# Patient Record
Sex: FEMALE | Race: WHITE | NOT HISPANIC OR LATINO
[De-identification: names, ages, dates, MRNs, and addresses within clinical notes are randomized per-mention and may not be internally consistent; named-entity substitution may affect disease eponyms.]

---

## 2018-02-15 ENCOUNTER — RESULT REVIEW (OUTPATIENT)
Age: 53
End: 2018-02-15

## 2018-05-09 ENCOUNTER — APPOINTMENT (OUTPATIENT)
Dept: GASTROENTEROLOGY | Facility: CLINIC | Age: 53
End: 2018-05-09

## 2019-04-25 ENCOUNTER — RESULT REVIEW (OUTPATIENT)
Age: 54
End: 2019-04-25

## 2020-01-22 ENCOUNTER — RESULT REVIEW (OUTPATIENT)
Age: 55
End: 2020-01-22

## 2020-04-25 ENCOUNTER — MESSAGE (OUTPATIENT)
Age: 55
End: 2020-04-25

## 2020-05-04 ENCOUNTER — APPOINTMENT (OUTPATIENT)
Dept: DISASTER EMERGENCY | Facility: HOSPITAL | Age: 55
End: 2020-05-04

## 2020-05-05 LAB
SARS-COV-2 IGG SERPL IA-ACNC: 1.3 AU/ML
SARS-COV-2 IGG SERPL QL IA: NEGATIVE

## 2021-03-15 ENCOUNTER — RESULT REVIEW (OUTPATIENT)
Age: 56
End: 2021-03-15

## 2023-07-18 ENCOUNTER — APPOINTMENT (OUTPATIENT)
Dept: ORTHOPEDIC SURGERY | Facility: CLINIC | Age: 58
End: 2023-07-18
Payer: COMMERCIAL

## 2023-07-18 ENCOUNTER — APPOINTMENT (OUTPATIENT)
Dept: ORTHOPEDIC SURGERY | Facility: CLINIC | Age: 58
End: 2023-07-18

## 2023-07-18 VITALS — HEIGHT: 63 IN | WEIGHT: 140 LBS | BODY MASS INDEX: 24.8 KG/M2

## 2023-07-18 DIAGNOSIS — M15.1 HEBERDEN'S NODES (WITH ARTHROPATHY): ICD-10-CM

## 2023-07-18 DIAGNOSIS — L40.50 ARTHROPATHIC PSORIASIS, UNSPECIFIED: ICD-10-CM

## 2023-07-18 PROCEDURE — 99203 OFFICE O/P NEW LOW 30 MIN: CPT

## 2023-07-18 PROCEDURE — 73130 X-RAY EXAM OF HAND: CPT | Mod: 50

## 2023-07-19 PROBLEM — M15.1: Status: ACTIVE | Noted: 2023-07-19

## 2023-07-19 PROBLEM — L40.50 PSORIATIC ARTHRITIS: Status: ACTIVE | Noted: 2023-07-19

## 2023-07-19 NOTE — DISCUSSION/SUMMARY
[de-identified] : - discussed etiology/ pathophysiology of her complaints in layman's terms\par - reviewed treatment options, conservative and operative\par - reviewed role for bracing, anti-inflammatory medications, injections and ultimately surgery\par - activity modifications\par - could consider CSI in the future should her symptoms warrant it\par - recommend consultation with rheumatology\par - NSAIDs prn pain\par - f/u prn

## 2023-07-19 NOTE — IMAGING
[de-identified] : Bilateral hands\par Heberden's nodes noted\par TTP at DIP joint of bilateral long fingers, R>L\par Full finger ROM\par nvi

## 2023-07-19 NOTE — HISTORY OF PRESENT ILLNESS
[de-identified] : 07/18/2023  ELAINA 58 year F is here for\par Location: Bilateral Hand \par Complaints: Patient reports right middle finger DIP pain, worse with motion and excessive activities.  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  No injuries that she can recall.  Despite this pain she is able to perform all of her activities without limitations.\par Onset: 1 month ago \par Prior treatments: None\par Hand dominance: Left \par Occupation: Nurse at [employer]\par PMH: Psoriasis on a DMARD (managed by her dermatologist)

## 2023-07-19 NOTE — DATA REVIEWED
[FreeTextEntry1] : 3 views of the bilateral hands: Moderate to severe degenerative disease noted involving the long fingers at the DIP and PIP joints, diffuse degenerative changes, no acute fractures

## 2023-10-18 ENCOUNTER — APPOINTMENT (OUTPATIENT)
Dept: INFECTIOUS DISEASE | Facility: CLINIC | Age: 58
End: 2023-10-18
Payer: COMMERCIAL

## 2023-10-18 VITALS
SYSTOLIC BLOOD PRESSURE: 105 MMHG | OXYGEN SATURATION: 97 % | HEIGHT: 63 IN | DIASTOLIC BLOOD PRESSURE: 64 MMHG | BODY MASS INDEX: 23.92 KG/M2 | TEMPERATURE: 97.6 F | HEART RATE: 109 BPM | WEIGHT: 135 LBS

## 2023-10-18 DIAGNOSIS — Z80.0 FAMILY HISTORY OF MALIGNANT NEOPLASM OF DIGESTIVE ORGANS: ICD-10-CM

## 2023-10-18 DIAGNOSIS — Z87.891 PERSONAL HISTORY OF NICOTINE DEPENDENCE: ICD-10-CM

## 2023-10-18 DIAGNOSIS — Z87.2 PERSONAL HISTORY OF DISEASES OF THE SKIN AND SUBCUTANEOUS TISSUE: ICD-10-CM

## 2023-10-18 DIAGNOSIS — C82.03 FOLLICULAR LYMPHOMA GRADE I, INTRA-ABDOMINAL LYMPH NODES: ICD-10-CM

## 2023-10-18 DIAGNOSIS — Z86.59 PERSONAL HISTORY OF OTHER MENTAL AND BEHAVIORAL DISORDERS: ICD-10-CM

## 2023-10-18 DIAGNOSIS — Z80.7 FAMILY HISTORY OF OTHER MALIGNANT NEOPLASMS OF LYMPHOID, HEMATOPOIETIC AND RELATED TISSUES: ICD-10-CM

## 2023-10-18 PROCEDURE — 99203 OFFICE O/P NEW LOW 30 MIN: CPT

## 2023-11-10 ENCOUNTER — EMERGENCY (EMERGENCY)
Facility: HOSPITAL | Age: 58
LOS: 1 days | Discharge: HOME | End: 2023-11-10
Attending: STUDENT IN AN ORGANIZED HEALTH CARE EDUCATION/TRAINING PROGRAM | Admitting: STUDENT IN AN ORGANIZED HEALTH CARE EDUCATION/TRAINING PROGRAM
Payer: COMMERCIAL

## 2023-11-10 VITALS
SYSTOLIC BLOOD PRESSURE: 140 MMHG | RESPIRATION RATE: 18 BRPM | HEART RATE: 67 BPM | DIASTOLIC BLOOD PRESSURE: 68 MMHG | OXYGEN SATURATION: 100 %

## 2023-11-10 VITALS
OXYGEN SATURATION: 100 % | TEMPERATURE: 97 F | SYSTOLIC BLOOD PRESSURE: 142 MMHG | RESPIRATION RATE: 18 BRPM | DIASTOLIC BLOOD PRESSURE: 55 MMHG | HEART RATE: 65 BPM

## 2023-11-10 LAB
ALBUMIN SERPL ELPH-MCNC: 4.2 G/DL — SIGNIFICANT CHANGE UP (ref 3.3–5)
ALBUMIN SERPL ELPH-MCNC: 4.2 G/DL — SIGNIFICANT CHANGE UP (ref 3.3–5)
ALP SERPL-CCNC: 90 U/L — SIGNIFICANT CHANGE UP (ref 40–120)
ALP SERPL-CCNC: 90 U/L — SIGNIFICANT CHANGE UP (ref 40–120)
ALT FLD-CCNC: 22 U/L — SIGNIFICANT CHANGE UP (ref 4–33)
ALT FLD-CCNC: 22 U/L — SIGNIFICANT CHANGE UP (ref 4–33)
ANION GAP SERPL CALC-SCNC: 15 MMOL/L — HIGH (ref 7–14)
ANION GAP SERPL CALC-SCNC: 15 MMOL/L — HIGH (ref 7–14)
AST SERPL-CCNC: 21 U/L — SIGNIFICANT CHANGE UP (ref 4–32)
AST SERPL-CCNC: 21 U/L — SIGNIFICANT CHANGE UP (ref 4–32)
BASOPHILS # BLD AUTO: 0.1 K/UL — SIGNIFICANT CHANGE UP (ref 0–0.2)
BASOPHILS # BLD AUTO: 0.1 K/UL — SIGNIFICANT CHANGE UP (ref 0–0.2)
BASOPHILS NFR BLD AUTO: 0.7 % — SIGNIFICANT CHANGE UP (ref 0–2)
BASOPHILS NFR BLD AUTO: 0.7 % — SIGNIFICANT CHANGE UP (ref 0–2)
BILIRUB SERPL-MCNC: 0.4 MG/DL — SIGNIFICANT CHANGE UP (ref 0.2–1.2)
BILIRUB SERPL-MCNC: 0.4 MG/DL — SIGNIFICANT CHANGE UP (ref 0.2–1.2)
BUN SERPL-MCNC: 13 MG/DL — SIGNIFICANT CHANGE UP (ref 7–23)
BUN SERPL-MCNC: 13 MG/DL — SIGNIFICANT CHANGE UP (ref 7–23)
CALCIUM SERPL-MCNC: 9.5 MG/DL — SIGNIFICANT CHANGE UP (ref 8.4–10.5)
CALCIUM SERPL-MCNC: 9.5 MG/DL — SIGNIFICANT CHANGE UP (ref 8.4–10.5)
CHLORIDE SERPL-SCNC: 101 MMOL/L — SIGNIFICANT CHANGE UP (ref 98–107)
CHLORIDE SERPL-SCNC: 101 MMOL/L — SIGNIFICANT CHANGE UP (ref 98–107)
CO2 SERPL-SCNC: 23 MMOL/L — SIGNIFICANT CHANGE UP (ref 22–31)
CO2 SERPL-SCNC: 23 MMOL/L — SIGNIFICANT CHANGE UP (ref 22–31)
CREAT SERPL-MCNC: 0.5 MG/DL — SIGNIFICANT CHANGE UP (ref 0.5–1.3)
CREAT SERPL-MCNC: 0.5 MG/DL — SIGNIFICANT CHANGE UP (ref 0.5–1.3)
EGFR: 109 ML/MIN/1.73M2 — SIGNIFICANT CHANGE UP
EGFR: 109 ML/MIN/1.73M2 — SIGNIFICANT CHANGE UP
EOSINOPHIL # BLD AUTO: 0.21 K/UL — SIGNIFICANT CHANGE UP (ref 0–0.5)
EOSINOPHIL # BLD AUTO: 0.21 K/UL — SIGNIFICANT CHANGE UP (ref 0–0.5)
EOSINOPHIL NFR BLD AUTO: 1.5 % — SIGNIFICANT CHANGE UP (ref 0–6)
EOSINOPHIL NFR BLD AUTO: 1.5 % — SIGNIFICANT CHANGE UP (ref 0–6)
GLUCOSE SERPL-MCNC: 146 MG/DL — HIGH (ref 70–99)
GLUCOSE SERPL-MCNC: 146 MG/DL — HIGH (ref 70–99)
HCT VFR BLD CALC: 39.5 % — SIGNIFICANT CHANGE UP (ref 34.5–45)
HCT VFR BLD CALC: 39.5 % — SIGNIFICANT CHANGE UP (ref 34.5–45)
HGB BLD-MCNC: 13.6 G/DL — SIGNIFICANT CHANGE UP (ref 11.5–15.5)
HGB BLD-MCNC: 13.6 G/DL — SIGNIFICANT CHANGE UP (ref 11.5–15.5)
IANC: 12.07 K/UL — HIGH (ref 1.8–7.4)
IANC: 12.07 K/UL — HIGH (ref 1.8–7.4)
IMM GRANULOCYTES NFR BLD AUTO: 0.3 % — SIGNIFICANT CHANGE UP (ref 0–0.9)
IMM GRANULOCYTES NFR BLD AUTO: 0.3 % — SIGNIFICANT CHANGE UP (ref 0–0.9)
LYMPHOCYTES # BLD AUTO: 0.37 K/UL — LOW (ref 1–3.3)
LYMPHOCYTES # BLD AUTO: 0.37 K/UL — LOW (ref 1–3.3)
LYMPHOCYTES # BLD AUTO: 2.7 % — LOW (ref 13–44)
LYMPHOCYTES # BLD AUTO: 2.7 % — LOW (ref 13–44)
MAGNESIUM SERPL-MCNC: 2 MG/DL — SIGNIFICANT CHANGE UP (ref 1.6–2.6)
MAGNESIUM SERPL-MCNC: 2 MG/DL — SIGNIFICANT CHANGE UP (ref 1.6–2.6)
MCHC RBC-ENTMCNC: 32 PG — SIGNIFICANT CHANGE UP (ref 27–34)
MCHC RBC-ENTMCNC: 32 PG — SIGNIFICANT CHANGE UP (ref 27–34)
MCHC RBC-ENTMCNC: 34.4 GM/DL — SIGNIFICANT CHANGE UP (ref 32–36)
MCHC RBC-ENTMCNC: 34.4 GM/DL — SIGNIFICANT CHANGE UP (ref 32–36)
MCV RBC AUTO: 92.9 FL — SIGNIFICANT CHANGE UP (ref 80–100)
MCV RBC AUTO: 92.9 FL — SIGNIFICANT CHANGE UP (ref 80–100)
MONOCYTES # BLD AUTO: 0.83 K/UL — SIGNIFICANT CHANGE UP (ref 0–0.9)
MONOCYTES # BLD AUTO: 0.83 K/UL — SIGNIFICANT CHANGE UP (ref 0–0.9)
MONOCYTES NFR BLD AUTO: 6.1 % — SIGNIFICANT CHANGE UP (ref 2–14)
MONOCYTES NFR BLD AUTO: 6.1 % — SIGNIFICANT CHANGE UP (ref 2–14)
NEUTROPHILS # BLD AUTO: 12.07 K/UL — HIGH (ref 1.8–7.4)
NEUTROPHILS # BLD AUTO: 12.07 K/UL — HIGH (ref 1.8–7.4)
NEUTROPHILS NFR BLD AUTO: 88.7 % — HIGH (ref 43–77)
NEUTROPHILS NFR BLD AUTO: 88.7 % — HIGH (ref 43–77)
NRBC # BLD: 0 /100 WBCS — SIGNIFICANT CHANGE UP (ref 0–0)
NRBC # BLD: 0 /100 WBCS — SIGNIFICANT CHANGE UP (ref 0–0)
NRBC # FLD: 0 K/UL — SIGNIFICANT CHANGE UP (ref 0–0)
NRBC # FLD: 0 K/UL — SIGNIFICANT CHANGE UP (ref 0–0)
PHOSPHATE SERPL-MCNC: 2.4 MG/DL — LOW (ref 2.5–4.5)
PHOSPHATE SERPL-MCNC: 2.4 MG/DL — LOW (ref 2.5–4.5)
PLATELET # BLD AUTO: 237 K/UL — SIGNIFICANT CHANGE UP (ref 150–400)
PLATELET # BLD AUTO: 237 K/UL — SIGNIFICANT CHANGE UP (ref 150–400)
POTASSIUM SERPL-MCNC: 3.8 MMOL/L — SIGNIFICANT CHANGE UP (ref 3.5–5.3)
POTASSIUM SERPL-MCNC: 3.8 MMOL/L — SIGNIFICANT CHANGE UP (ref 3.5–5.3)
POTASSIUM SERPL-SCNC: 3.8 MMOL/L — SIGNIFICANT CHANGE UP (ref 3.5–5.3)
POTASSIUM SERPL-SCNC: 3.8 MMOL/L — SIGNIFICANT CHANGE UP (ref 3.5–5.3)
PROT SERPL-MCNC: 6.4 G/DL — SIGNIFICANT CHANGE UP (ref 6–8.3)
PROT SERPL-MCNC: 6.4 G/DL — SIGNIFICANT CHANGE UP (ref 6–8.3)
RBC # BLD: 4.25 M/UL — SIGNIFICANT CHANGE UP (ref 3.8–5.2)
RBC # BLD: 4.25 M/UL — SIGNIFICANT CHANGE UP (ref 3.8–5.2)
RBC # FLD: 14.2 % — SIGNIFICANT CHANGE UP (ref 10.3–14.5)
RBC # FLD: 14.2 % — SIGNIFICANT CHANGE UP (ref 10.3–14.5)
SODIUM SERPL-SCNC: 139 MMOL/L — SIGNIFICANT CHANGE UP (ref 135–145)
SODIUM SERPL-SCNC: 139 MMOL/L — SIGNIFICANT CHANGE UP (ref 135–145)
WBC # BLD: 13.62 K/UL — HIGH (ref 3.8–10.5)
WBC # BLD: 13.62 K/UL — HIGH (ref 3.8–10.5)
WBC # FLD AUTO: 13.62 K/UL — HIGH (ref 3.8–10.5)
WBC # FLD AUTO: 13.62 K/UL — HIGH (ref 3.8–10.5)

## 2023-11-10 PROCEDURE — 71045 X-RAY EXAM CHEST 1 VIEW: CPT | Mod: 26

## 2023-11-10 PROCEDURE — 93010 ELECTROCARDIOGRAM REPORT: CPT

## 2023-11-10 PROCEDURE — 99284 EMERGENCY DEPT VISIT MOD MDM: CPT

## 2023-11-10 RX ORDER — METOCLOPRAMIDE HCL 10 MG
10 TABLET ORAL ONCE
Refills: 0 | Status: COMPLETED | OUTPATIENT
Start: 2023-11-10 | End: 2023-11-10

## 2023-11-10 RX ORDER — SODIUM CHLORIDE 9 MG/ML
1000 INJECTION, SOLUTION INTRAVENOUS ONCE
Refills: 0 | Status: COMPLETED | OUTPATIENT
Start: 2023-11-10 | End: 2023-11-10

## 2023-11-10 RX ADMIN — SODIUM CHLORIDE 1000 MILLILITER(S): 9 INJECTION, SOLUTION INTRAVENOUS at 02:39

## 2023-11-10 RX ADMIN — Medication 10 MILLIGRAM(S): at 02:38

## 2023-11-10 NOTE — ED ADULT TRIAGE NOTE - NS ED TRIAGE AVPU SCALE
awaiting discharge, no change awaiting consult Alert-The patient is alert, awake and responds to voice. The patient is oriented to time, place, and person. The triage nurse is able to obtain subjective information.

## 2023-11-10 NOTE — ED PROVIDER NOTE - PROGRESS NOTE DETAILS
Richard Dodge PGY3: pt symptomatically improved, avss, neuro intact, results discussed. Return precautions discussed, verbal understanding demonstrated, all questions answered.

## 2023-11-10 NOTE — ED ADULT NURSE NOTE - NSFALLRISKINTERV_ED_ALL_ED

## 2023-11-10 NOTE — ED PROVIDER NOTE - CLINICAL SUMMARY MEDICAL DECISION MAKING FREE TEXT BOX
58-year-old female history of vasovagal in the past presents here with a similar episode with presyncope nausea and vomiting.  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  Patient bearing down when she started feeling lightheaded did not lose consciousness however became nauseous and vomited.  Currently feels nauseous and lightheaded.  3 previous episodes previously worked up diagnosed with vasovagal.  She is denying any room spinning, any weakness, numbness, fever/chills, CP, SOB, Carlos pain, dsyuria.  AVSS, patient appears nauseous, ANO x3, CN II through XII intact, no focal neurodeficits, abdomen soft, ND NT.  Concern for presyncopal episode, consistent with patient's previous episodes of vasovagal, rule out any underlying cardiac arrhythmia, glycemia, dehydration/electrolyte or metabolic abnormality.  We will get labs, EKG, CXR give IVF, antiemetic and reassess.

## 2023-11-10 NOTE — ED PROVIDER NOTE - ATTENDING CONTRIBUTION TO CARE
58-year-old female with history of vasovagal syncope in the past coming in with similar symptoms today.  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  States this usually happens when she is stressed out.  States she has been seen by a cardiologist and had work-up done.  Reports because she she does not actually syncopize she is not on any medications.  Patient reports she is back to her baseline.  Denies chest pain, shortness of breath, abdominal pain, fevers, chills, change in strength or sensation in her extremities.  Patient is well-appearing.  No distress.  Clear to auscultation bilaterally.  Regular rate and rhythm.  Abdomen soft nontender.  No pedal edema.  Differential diagnoses include but not limited to near syncope, electrolyte abnormalities, anemia.  EKG is nonischemic. Fu with cardiologist and pcp for continue care.

## 2023-11-10 NOTE — ED ADULT NURSE REASSESSMENT NOTE - NS ED NURSE REASSESS COMMENT FT1
iv fluids completed without complications, iv site WNL. pt sleeping in stretcher in NAD, RR even and unlabored. maintained on cardiac monitor, NSR at 88 bpm.  at bedside, states no further episodes of vomiting. pt safety measures maintained, side rails up x2.

## 2023-11-10 NOTE — ED PROVIDER NOTE - NSFOLLOWUPINSTRUCTIONS_ED_ALL_ED_FT
In the ED for your presyncopal event with nausea and vomiting.  Episode consistent with previous episodes of vasovagal.  Labs, chest x-ray results have been attached.  We gave you IV fluid and antiemetic Reglan.  Please follow-up with your primary doctor as needed    Please seek immediate medical attention return to ED if you have any new or worsening symptoms.

## 2023-11-10 NOTE — ED ADULT NURSE REASSESSMENT NOTE - NS ED NURSE REASSESS COMMENT FT1
discharge instructions provided by MD at bedside, pt verbalized understanding, papers provided. iv removed, gauze dressing in place. pt A&Ox4, NUNO equal bilaterally, able to walk with steady gait. all belongings with pt,  at side

## 2023-11-10 NOTE — ED ADULT TRIAGE NOTE - CHIEF COMPLAINT QUOTE
Patient called as rapid response for near syncopal episode and vomiting. Endorses dizziness. Denies chest pain, SOB, abdominal pain and headache. Hx. lymphoma, last chemo 3 weeks ago.

## 2023-11-10 NOTE — ED ADULT NURSE NOTE - OBJECTIVE STATEMENT
58 year old female, received to room 6. Pt A&Ox4, ambulatory. Respirations equal and unlabored. Past medical history of lymphoma, last chemo approx 3 weeks ago. Pt was a rapid response in [facility]. Pt had near syncopal episode, pt states she became dizzy and nauseous and felt like she was going to pass out. Pt endorsing nausea at this time, denies vomiting, urinary symptoms, chest pain, SOB, palpitations, dizziness, blurry vision, numbness, tingling, any other complaints. Neuro intact. Pt placed on cardiac monitor, normal sinus. Abdomen soft, nontender, nondistended. Skin dry and intact. 20G IV placed to R antecubital space. Labs drawn and sent. Medicated as per EMR orders. Pending lab results. No acute distress noted. Pt provided with blanket at request. Safety maintained, bed in lowest position, side rails raised, call bell in reach.

## 2024-01-17 ENCOUNTER — APPOINTMENT (OUTPATIENT)
Dept: RHEUMATOLOGY | Facility: CLINIC | Age: 59
End: 2024-01-17

## 2024-01-24 ENCOUNTER — NON-APPOINTMENT (OUTPATIENT)
Age: 59
End: 2024-01-24

## 2024-01-24 ENCOUNTER — LABORATORY RESULT (OUTPATIENT)
Age: 59
End: 2024-01-24

## 2024-01-24 ENCOUNTER — APPOINTMENT (OUTPATIENT)
Dept: RHEUMATOLOGY | Facility: CLINIC | Age: 59
End: 2024-01-24
Payer: COMMERCIAL

## 2024-01-24 VITALS
HEIGHT: 63.5 IN | WEIGHT: 142 LBS | TEMPERATURE: 96.9 F | DIASTOLIC BLOOD PRESSURE: 62 MMHG | BODY MASS INDEX: 24.85 KG/M2 | SYSTOLIC BLOOD PRESSURE: 132 MMHG | OXYGEN SATURATION: 98 % | HEART RATE: 80 BPM

## 2024-01-24 DIAGNOSIS — Z78.9 OTHER SPECIFIED HEALTH STATUS: ICD-10-CM

## 2024-01-24 DIAGNOSIS — Z78.0 ASYMPTOMATIC MENOPAUSAL STATE: ICD-10-CM

## 2024-01-24 PROCEDURE — 99205 OFFICE O/P NEW HI 60 MIN: CPT

## 2024-01-24 PROCEDURE — G2211 COMPLEX E/M VISIT ADD ON: CPT

## 2024-01-24 RX ORDER — GLUCOSAM/CHONDR-MSM1/D3/C/MANG 750-625MG
TABLET ORAL
Refills: 0 | Status: ACTIVE | COMMUNITY

## 2024-01-24 RX ORDER — CLOBETASOL PROPIONATE 0.05 G/100ML
0.05 SHAMPOO TOPICAL DAILY
Qty: 1 | Refills: 0 | Status: ACTIVE | COMMUNITY
Start: 2024-01-24 | End: 1900-01-01

## 2024-01-24 RX ORDER — ASCORBIC ACID 500 MG
TABLET ORAL
Refills: 0 | Status: ACTIVE | COMMUNITY

## 2024-01-24 RX ORDER — ESCITALOPRAM OXALATE 10 MG/1
10 TABLET, FILM COATED ORAL
Refills: 0 | Status: ACTIVE | COMMUNITY

## 2024-01-24 NOTE — PHYSICAL EXAM
[General Appearance - Alert] : alert [General Appearance - In No Acute Distress] : in no acute distress [Sclera] : the sclera and conjunctiva were normal [Extraocular Movements] : extraocular movements were intact [Outer Ear] : the ears and nose were normal in appearance [Neck Appearance] : the appearance of the neck was normal [] : no respiratory distress [Respiration, Rhythm And Depth] : normal respiratory rhythm and effort [Heart Rate And Rhythm] : heart rate was normal and rhythm regular [Heart Sounds] : normal S1 and S2 [Abdomen Soft] : soft [Abdomen Tenderness] : non-tender [Cervical Lymph Nodes Enlarged Anterior Bilaterally] : anterior cervical [Supraclavicular Lymph Nodes Enlarged Bilaterally] : supraclavicular [No CVA Tenderness] : no ~M costovertebral angle tenderness [No Spinal Tenderness] : no spinal tenderness [Motor Tone] : muscle strength and tone were normal [No Focal Deficits] : no focal deficits [Impaired Insight] : insight and judgment were intact [Mood] : the mood was normal [FreeTextEntry1] : minimal redness in scalp

## 2024-01-24 NOTE — HISTORY OF PRESENT ILLNESS
[FreeTextEntry1] : 58-year-old female, here for the first time w/ hx of anxiety/depression; OA hands; reports hx of psoriasis of scalp around 2000 w/ Derm, Dr. Juwan Lewis; with follicular lymphoma diagnosed around 9/2023 on Rituxan for chemotherapy w/ her Heme/onc at this time. Patient states she has psoriasis of scalp at times. She states she was on Otezla in the past w/ her Derm but stopped as had diarrhea on it. She states she was on Sotyktu w/ her Derm and stopped when diagnosed w/ lymphoma. Denies any swelling or redness or warmth of any joints at this time. Denies any neck pain or LBP at this time. Denies any fever/chills, no rashes, no ulcers, no dry eyes, no dry mouth, no raynaud's, no infectious diarrhea or  symptoms at this time.  Patient states she is postmenopausal with no Dexa in  over 2 yrs and would be interested in finding out about her bone health.

## 2024-01-24 NOTE — ASSESSMENT
[FreeTextEntry1] : 58-year-old female, here for the first time w/ hx of anxiety/depression; OA hands; reports hx of psoriasis of scalp around 2000 w/ Derm, Dr. Juwan Lewis; with follicular lymphoma diagnosed around 9/2023 on Rituxan for chemotherapy w/ her Heme/onc at this time.  Psoriasis: states of scalp at times -discussed r/b/s of clobetasol shampoo PRN w/ pt agreeable and prescription sent as below -states was on Otezla in the past w/ her Derm but stopped as had diarrhea on it -states she was on Sotyktu w/ her Derm and stopped when diagnosed w/ lymphoma  -No synovitis or effusion on exam noted today and advised to monitor. -discussed if developed psoriatic arthritis in the future will consider Cosentyx then perhaps and not needed at this time -labs as below incld ESR, CRP, serologies to be complete  OA hands: noted to have heberden nodes at DIPs with PIP hypertrophy without pain at this time and will monitor.  Depression/Anxiety: denies any SI or HI. -on lexapro 10mg daily -not much tender points of fibromyalgia today  Bone health: postmenopausal patient reports no Dexa in over 2 yrs w/ Dexa referral given today to evaluate for osteopenia/osteoporosis.  -educated on symptoms to monitor for in detail and alert us if any concerns. -knows to stay up to date on health maintenance w/ PCP -f/u in 10-14days w/ labs, Dexa please

## 2024-01-25 LAB
ALBUMIN SERPL ELPH-MCNC: 4.5 G/DL
ALP BLD-CCNC: 89 U/L
ALT SERPL-CCNC: 24 U/L
ANION GAP SERPL CALC-SCNC: 12 MMOL/L
AST SERPL-CCNC: 18 U/L
BASOPHILS # BLD AUTO: 0.07 K/UL
BASOPHILS NFR BLD AUTO: 2.2 %
BILIRUB SERPL-MCNC: 0.2 MG/DL
BUN SERPL-MCNC: 11 MG/DL
CALCIUM SERPL-MCNC: 9 MG/DL
CCP AB SER IA-ACNC: <8 UNITS
CHLORIDE SERPL-SCNC: 106 MMOL/L
CO2 SERPL-SCNC: 26 MMOL/L
CREAT SERPL-MCNC: 0.59 MG/DL
CRP SERPL-MCNC: <3 MG/L
EGFR: 104 ML/MIN/1.73M2
EOSINOPHIL # BLD AUTO: 0.44 K/UL
EOSINOPHIL NFR BLD AUTO: 13.8 %
ERYTHROCYTE [SEDIMENTATION RATE] IN BLOOD BY WESTERGREN METHOD: 7 MM/HR
GLUCOSE SERPL-MCNC: 97 MG/DL
HCT VFR BLD CALC: 39.8 %
HGB BLD-MCNC: 13.3 G/DL
HIV1+2 AB SPEC QL IA.RAPID: NONREACTIVE
IMM GRANULOCYTES NFR BLD AUTO: 0.3 %
LYMPHOCYTES # BLD AUTO: 0.2 K/UL
LYMPHOCYTES NFR BLD AUTO: 6.3 %
MAN DIFF?: NORMAL
MCHC RBC-ENTMCNC: 33.3 PG
MCHC RBC-ENTMCNC: 33.4 GM/DL
MCV RBC AUTO: 99.5 FL
MONOCYTES # BLD AUTO: 0.48 K/UL
MONOCYTES NFR BLD AUTO: 15 %
NEUTROPHILS # BLD AUTO: 1.99 K/UL
NEUTROPHILS NFR BLD AUTO: 62.4 %
PLATELET # BLD AUTO: 214 K/UL
POTASSIUM SERPL-SCNC: 4.1 MMOL/L
PROT SERPL-MCNC: 6.5 G/DL
RBC # BLD: 4 M/UL
RBC # FLD: 13.2 %
RF+CCP IGG SER-IMP: NEGATIVE
RHEUMATOID FACT SER QL: <10 IU/ML
SODIUM SERPL-SCNC: 144 MMOL/L
WBC # FLD AUTO: 3.19 K/UL

## 2024-01-29 LAB
G6PD SER-CCNC: 15.9 U/G HGB
HLA-B27 QL NAA+PROBE: NORMAL

## 2024-02-05 ENCOUNTER — NON-APPOINTMENT (OUTPATIENT)
Age: 59
End: 2024-02-05

## 2024-02-07 ENCOUNTER — APPOINTMENT (OUTPATIENT)
Dept: RHEUMATOLOGY | Facility: CLINIC | Age: 59
End: 2024-02-07
Payer: COMMERCIAL

## 2024-02-07 VITALS
WEIGHT: 141 LBS | DIASTOLIC BLOOD PRESSURE: 78 MMHG | OXYGEN SATURATION: 98 % | BODY MASS INDEX: 24.67 KG/M2 | HEART RATE: 78 BPM | TEMPERATURE: 97.9 F | HEIGHT: 63.5 IN | SYSTOLIC BLOOD PRESSURE: 122 MMHG

## 2024-02-07 DIAGNOSIS — Z71.2 PERSON CONSULTING FOR EXPLANATION OF EXAMINATION OR TEST FINDINGS: ICD-10-CM

## 2024-02-07 PROCEDURE — G2211 COMPLEX E/M VISIT ADD ON: CPT

## 2024-02-07 PROCEDURE — 99214 OFFICE O/P EST MOD 30 MIN: CPT

## 2024-02-07 RX ORDER — CHROMIUM 200 MCG
TABLET ORAL
Refills: 0 | Status: DISCONTINUED | COMMUNITY
End: 2024-02-07

## 2024-02-07 RX ORDER — RITUXIMAB 10 MG/ML
INJECTION, SOLUTION INTRAVENOUS
Refills: 0 | Status: ACTIVE | COMMUNITY

## 2024-02-07 NOTE — PHYSICAL EXAM
[General Appearance - Alert] : alert [General Appearance - In No Acute Distress] : in no acute distress [Sclera] : the sclera and conjunctiva were normal [Extraocular Movements] : extraocular movements were intact [Outer Ear] : the ears and nose were normal in appearance [Neck Appearance] : the appearance of the neck was normal [] : no respiratory distress [Respiration, Rhythm And Depth] : normal respiratory rhythm and effort [Heart Rate And Rhythm] : heart rate was normal and rhythm regular [Heart Sounds] : normal S1 and S2 [Abdomen Soft] : soft [Abdomen Tenderness] : non-tender [Cervical Lymph Nodes Enlarged Anterior Bilaterally] : anterior cervical [Supraclavicular Lymph Nodes Enlarged Bilaterally] : supraclavicular [No CVA Tenderness] : no ~M costovertebral angle tenderness [Motor Tone] : muscle strength and tone were normal [No Focal Deficits] : no focal deficits [Impaired Insight] : insight and judgment were intact [Mood] : the mood was normal [FreeTextEntry1] : minimal psoriasis of scalp

## 2024-02-07 NOTE — HISTORY OF PRESENT ILLNESS
[FreeTextEntry1] : 58-year-old female, here for the first follow up w/ hx of anxiety/depression; OA hands; reports hx of psoriasis of scalp around 2000 w/ Derm, Dr. Juwan Lewis; with follicular lymphoma diagnosed around 9/2023 on Rituxan for chemotherapy w/ her Heme/onc at this time. Patient states she has psoriasis of scalp at times. She states she was on Otezla in the past w/ her Derm but stopped as had diarrhea on it. She states she was on Sotyktu w/ her Derm and stopped when diagnosed w/ lymphoma. Denies any swelling or redness or warmth of any joints at this time. Denies any neck pain or LBP at this time. Denies any fever/chills, no rashes, no ulcers, no dry eyes, no dry mouth, no raynaud's, no infectious diarrhea or  symptoms at this time.  Patient has Osteoporosis on Dexa 1/30/24.  States first time hearing about having Osteoporosis with no prior treatment.  Denies any history of fractures.  Denies any family history of osteoporosis.  States she was a former smoker.

## 2024-02-07 NOTE — ASSESSMENT
[FreeTextEntry1] : 58-year-old female, here for first follow up w/ hx of anxiety/depression; OA hands; reports hx of psoriasis of scalp around 2000 w/ Derm, Dr. Juwan Lewis; with follicular lymphoma diagnosed around 9/2023 on Rituxan for chemotherapy w/ her Heme/onc at this time. She has Osteoporosis. -reviewed labs 1/24/24 w/ ESR normal; CRP normal; HLA-B27 negative; RF/CCP negative; CMP ok; CBC w/ mildly low WBC=3.19 (knows to monitor w/ her Heme/onc); HIV negative  Osteoporosis: reviewed on Dexa 1/30/23 w/ lowest t score -3.0 -patient states no prior treatment  -defers daily forteo injections for now w/ no history of fractures thus far  -Discussed r/bs of Fosamax 70mg PO weekly in detail to take w/ a glass of water on an empty stomach and to avoid laying down for 30mins and understands. -denies any dental issues w/ no recent or plans for any dental extraction or implants and will inform her dentist she is on fosamax  -aside from being postmenopausal put in to check for secondary causes of osteoporosis incld TSH, PTH, vitD -encouraged Ca+vitD supplementation  -encouraged weight bearing exercises as tolerated.  Psoriasis: states of scalp at times -discussed r/b/s of clobetasol shampoo PRN w/ pt agreeable -states was on Otezla in the past w/ her Derm but stopped as had diarrhea on it -states she was on Sotyktu w/ her Derm and stopped when diagnosed w/ lymphoma -No synovitis or effusion on exam noted today and advised to monitor. -discussed if developed psoriatic arthritis in the future will consider Cosentyx then perhaps and not needed at this time -labs as below incld ESR, CRP, serologies to be complete  OA hands: noted to have heberden nodes at DIPs with PIP hypertrophy without synovitis at this time. -discussed r/b/s of voltaren gel 1% PRN w/ pt agreeable and prescription sent as below  Depression/Anxiety: denies any SI or HI. -on lexapro 10mg daily -not much tender points of fibromyalgia today  -educated on symptoms to monitor for in detail and alert us if any concerns. -knows to stay up to date on health maintenance w/ PCP -f/u in 2-3 mo w/ labs please or sooner as needed.

## 2024-02-07 NOTE — REASON FOR VISIT
[Follow-Up: _____] : a [unfilled] follow-up visit [FreeTextEntry1] : Osteoporosis; Psoriasis; OA; review labs/Dexa/meds

## 2024-02-26 ENCOUNTER — RX RENEWAL (OUTPATIENT)
Age: 59
End: 2024-02-26

## 2024-02-28 ENCOUNTER — NON-APPOINTMENT (OUTPATIENT)
Age: 59
End: 2024-02-28

## 2024-02-28 ENCOUNTER — APPOINTMENT (OUTPATIENT)
Dept: CARDIOLOGY | Facility: CLINIC | Age: 59
End: 2024-02-28
Payer: COMMERCIAL

## 2024-02-28 VITALS
DIASTOLIC BLOOD PRESSURE: 79 MMHG | OXYGEN SATURATION: 95 % | SYSTOLIC BLOOD PRESSURE: 126 MMHG | HEART RATE: 84 BPM | HEIGHT: 63.5 IN

## 2024-02-28 DIAGNOSIS — R94.31 ABNORMAL ELECTROCARDIOGRAM [ECG] [EKG]: ICD-10-CM

## 2024-02-28 DIAGNOSIS — R55 SYNCOPE AND COLLAPSE: ICD-10-CM

## 2024-02-28 PROCEDURE — G2211 COMPLEX E/M VISIT ADD ON: CPT

## 2024-02-28 PROCEDURE — 99204 OFFICE O/P NEW MOD 45 MIN: CPT

## 2024-02-28 PROCEDURE — 93000 ELECTROCARDIOGRAM COMPLETE: CPT

## 2024-02-28 NOTE — PHYSICAL EXAM
[Well Developed] : well developed [No Acute Distress] : no acute distress [Well Nourished] : well nourished [Normal Conjunctiva] : normal conjunctiva [No Carotid Bruit] : no carotid bruit [Normal Venous Pressure] : normal venous pressure [No Murmur] : no murmur [Normal S1, S2] : normal S1, S2 [No Rub] : no rub [No Gallop] : no gallop [Good Air Entry] : good air entry [Clear Lung Fields] : clear lung fields [Soft] : abdomen soft [No Respiratory Distress] : no respiratory distress  [Non Tender] : non-tender [No Masses/organomegaly] : no masses/organomegaly [Normal Bowel Sounds] : normal bowel sounds [Normal Gait] : normal gait [No Edema] : no edema [No Cyanosis] : no cyanosis [No Varicosities] : no varicosities [No Clubbing] : no clubbing [No Rash] : no rash [No Skin Lesions] : no skin lesions [Moves all extremities] : moves all extremities [No Focal Deficits] : no focal deficits [Alert and Oriented] : alert and oriented [Normal Speech] : normal speech [Normal memory] : normal memory

## 2024-02-28 NOTE — DISCUSSION/SUMMARY
[FreeTextEntry1] : 58 year woman with a history as listed presents for an initial cardiac evaluation.  Fern has a history of vagal syncope. He needs to stay better hydrated. Maintain her current salt intake.  She denies any anginal symptoms. Clinically she is euvolemic on exam. Her EKG did not reveal any significant ischemic changes. She will undergo a treadmill exercise stress test to define exercise tolerance, rule out exertional hypertensive responses, assess for exercise induced arrhythmias and rule out ischemia from obstructive CAD. She will get a 2d echo to assess for any  new structural heart disease, changes in valvular and ventricular function.  Exercise and diet counseling was performed in order to reduce her future cardiovascular risk. She will follow-up with me in 6 months or sooner if necessary.    [EKG obtained to assist in diagnosis and management of assessed problem(s)] : EKG obtained to assist in diagnosis and management of assessed problem(s)

## 2024-02-28 NOTE — HISTORY OF PRESENT ILLNESS
[FreeTextEntry1] : 58 year old woman with a history of vasovagal syncope, follicular lymphoma. anxiety presents for an inital cardiac evaluation.   In the summer, she was diagnosed with a follicular lymphoma. She had 4 months of ritoxumab and now in remission and Maintenace of  Our that time she had 2 episodes of near syncope while working. She started to get nauseous and then felt lightheaded. She denies any LOC.  She   denies any chest pain, PND, orthopnea, lower extremity edema, near syncope, syncope, strokelike symptoms.  She is very active at work (peds nurse).

## 2024-03-21 ENCOUNTER — APPOINTMENT (OUTPATIENT)
Dept: CARDIOLOGY | Facility: CLINIC | Age: 59
End: 2024-03-21
Payer: COMMERCIAL

## 2024-03-21 PROCEDURE — 93306 TTE W/DOPPLER COMPLETE: CPT

## 2024-03-21 PROCEDURE — 93015 CV STRESS TEST SUPVJ I&R: CPT

## 2024-03-25 ENCOUNTER — RX RENEWAL (OUTPATIENT)
Age: 59
End: 2024-03-25

## 2024-04-24 ENCOUNTER — APPOINTMENT (OUTPATIENT)
Dept: RHEUMATOLOGY | Facility: CLINIC | Age: 59
End: 2024-04-24
Payer: COMMERCIAL

## 2024-04-24 VITALS
SYSTOLIC BLOOD PRESSURE: 118 MMHG | HEART RATE: 70 BPM | HEIGHT: 64 IN | BODY MASS INDEX: 24.24 KG/M2 | TEMPERATURE: 97.6 F | DIASTOLIC BLOOD PRESSURE: 70 MMHG | OXYGEN SATURATION: 96 % | WEIGHT: 142 LBS

## 2024-04-24 DIAGNOSIS — M19.041 PRIMARY OSTEOARTHRITIS, RIGHT HAND: ICD-10-CM

## 2024-04-24 DIAGNOSIS — F32.A ANXIETY DISORDER, UNSPECIFIED: ICD-10-CM

## 2024-04-24 DIAGNOSIS — M19.042 PRIMARY OSTEOARTHRITIS, RIGHT HAND: ICD-10-CM

## 2024-04-24 DIAGNOSIS — L40.9 PSORIASIS, UNSPECIFIED: ICD-10-CM

## 2024-04-24 DIAGNOSIS — F41.9 ANXIETY DISORDER, UNSPECIFIED: ICD-10-CM

## 2024-04-24 DIAGNOSIS — M81.0 AGE-RELATED OSTEOPOROSIS W/OUT CURRENT PATHOLOGICAL FRACTURE: ICD-10-CM

## 2024-04-24 PROCEDURE — 99214 OFFICE O/P EST MOD 30 MIN: CPT

## 2024-04-24 PROCEDURE — G2211 COMPLEX E/M VISIT ADD ON: CPT

## 2024-04-24 RX ORDER — CALCIUM CARBONATE/VITAMIN D3 600 MG-10
600-10 TABLET ORAL
Qty: 60 | Refills: 2 | Status: ACTIVE | COMMUNITY
Start: 2024-02-07 | End: 1900-01-01

## 2024-04-24 RX ORDER — ALENDRONATE SODIUM 70 MG/1
70 TABLET ORAL
Qty: 4 | Refills: 2 | Status: ACTIVE | COMMUNITY
Start: 2024-02-07 | End: 1900-01-01

## 2024-04-24 NOTE — PHYSICAL EXAM
[General Appearance - Alert] : alert [General Appearance - In No Acute Distress] : in no acute distress [Sclera] : the sclera and conjunctiva were normal [Extraocular Movements] : extraocular movements were intact [Outer Ear] : the ears and nose were normal in appearance [Neck Appearance] : the appearance of the neck was normal [Respiration, Rhythm And Depth] : normal respiratory rhythm and effort [Heart Sounds] : normal S1 and S2 [Heart Rate And Rhythm] : heart rate was normal and rhythm regular [Abdomen Soft] : soft [Abdomen Tenderness] : non-tender [Cervical Lymph Nodes Enlarged Anterior Bilaterally] : anterior cervical [Supraclavicular Lymph Nodes Enlarged Bilaterally] : supraclavicular [No CVA Tenderness] : no ~M costovertebral angle tenderness [Motor Tone] : muscle strength and tone were normal [] : no rash [No Focal Deficits] : no focal deficits [Impaired Insight] : insight and judgment were intact [Mood] : the mood was normal [FreeTextEntry1] : No synovitis or effusion on exam noted today; Good ROM in b/l shoulders, no pelvic/girdle stiffness and able to stand up without using her hands

## 2024-04-24 NOTE — REASON FOR VISIT
[Follow-Up: _____] : a [unfilled] follow-up visit [FreeTextEntry1] : Osteoporosis; Psoriasis; OA; review labs/meds.

## 2024-04-24 NOTE — HISTORY OF PRESENT ILLNESS
[FreeTextEntry1] : 59-year-old female, here for follow up w/ hx of anxiety/depression; OA hands; reports hx of psoriasis of scalp around 2000 w/ Derm, Dr. Juwan Lewis; with follicular lymphoma diagnosed around 9/2023 on Rituxan for chemotherapy w/ her Heme/onc at this time. Patient states she has psoriasis of scalp at times. She states she was on Otezla in the past w/ her Derm but stopped as had diarrhea on it. She states she was on Sotyktu w/ her Derm and stopped when diagnosed w/ lymphoma. Denies any swelling or redness or warmth of any joints at this time. Denies any neck pain or LBP at this time. Denies any fever/chills, no rashes, no ulcers, no dry eyes, no dry mouth, no raynaud's, no infectious diarrhea or  symptoms at this time.  Patient has Osteoporosis on Dexa 1/30/24. States she taking the Fosamax that she is tolerating well at this time. Denies any history of fractures. Denies any family history of osteoporosis. States she was a former smoker.

## 2024-04-24 NOTE — ASSESSMENT
[FreeTextEntry1] : 59-year-old female, here for follow up w/ hx of anxiety/depression; OA hands; reports hx of psoriasis of scalp around 2000 w/ Derm, Dr. Juwan Lewis; with follicular lymphoma diagnosed around 9/2023 on Rituxan for chemotherapy w/ her Heme/onc at this time. She has Osteoporosis. -reviewed labs [facility] 4/17/24 w/ BMP w/ Ca=8.6 (mildly low to monitor w/ PCP please); vit D normal; TSH normal; PTH normal; 4/9/24 w/ CBC w/ low WBC=3.0 (told to moniotr w/ her Heme/onc on Rituxan); 1/25/24 G6PD normal; 1/24/24 w/ ESR normal; CRP normal; HLA-B27 negative; RF/CCP negative; CMP ok; CBC w/ mildly low WBC=3.19 (knows to monitor w/ her Heme/onc); HIV negative  Osteoporosis: on Dexa 1/30/23 w/ lowest t score -3.0 -defers daily forteo injections for now w/ no history of fractures thus far -Discussed r/bs of refilling Fosamax 70mg PO weekly in detail to take w/ a glass of water on an empty stomach and to avoid laying down for 30mins and understands. -denies any dental issues w/ no recent or plans for any dental extraction or implants and will inform her dentist she is on fosamax -aside from being postmenopausal put in to checked for secondary causes of osteoporosis incld normal TSH, normal PTH, normal vitD on labs 4/17/24 -encouraged Ca+vitD supplementation -encouraged weight bearing exercises as tolerated.  Psoriasis: states of scalp at times -clobetasol shampoo PRN  -states was on Otezla in the past w/ her Derm but stopped as had diarrhea on it -states she was on Sotyktu w/ her Derm and stopped when diagnosed w/ lymphoma -No synovitis or effusion on exam noted today and advised to monitor. -discussed if developed psoriatic arthritis in the future will consider Cosentyx then perhaps and not needed at this time -labs as below incld ESR, CRP, serologies to be complete  OA hands: noted to have heberden nodes at DIPs with PIP hypertrophy without synovitis at this time. -discussed r/b/s of voltaren gel 1% PRN w/ pt agreeable and prescription sent as below  Depression/Anxiety: denies any SI or HI. -on lexapro 10mg daily -not much tender points of fibromyalgia today  -educated on symptoms to monitor for in detail and alert us if any concerns. -knows to stay up to date on health maintenance w/ PCP -f/u in 10-12 weeks w/ labs please or sooner as needed.

## 2024-05-29 ENCOUNTER — RX RENEWAL (OUTPATIENT)
Age: 59
End: 2024-05-29

## 2024-07-01 ENCOUNTER — RX RENEWAL (OUTPATIENT)
Age: 59
End: 2024-07-01

## 2024-08-01 ENCOUNTER — RX RENEWAL (OUTPATIENT)
Age: 59
End: 2024-08-01

## 2024-08-07 ENCOUNTER — APPOINTMENT (OUTPATIENT)
Dept: RHEUMATOLOGY | Facility: CLINIC | Age: 59
End: 2024-08-07

## 2024-08-07 PROCEDURE — 99214 OFFICE O/P EST MOD 30 MIN: CPT

## 2024-08-07 PROCEDURE — G2211 COMPLEX E/M VISIT ADD ON: CPT

## 2024-08-07 NOTE — HISTORY OF PRESENT ILLNESS
[FreeTextEntry1] : Verbal consent given for telehealth video visit on 8/7/24 by patient, via [facility] with visit done from my [facility] at [address] [place] [address] in [place] and patient at her home on her phone in [place].  59-year-old female, for follow up w/ hx of anxiety/depression; OA hands; reports hx of psoriasis of scalp around 2000 w/ Derm, Dr. Juwan Lewis; with follicular lymphoma diagnosed around 9/2023 on Rituxan for chemotherapy w/ her Heme/onc at this time. Patient states she has psoriasis of scalp at times. She states she was on Otezla in the past w/ her Derm but stopped as had diarrhea on it. She states she was on Sotyktu w/ her Derm and stopped when diagnosed w/ lymphoma. Denies any swelling or redness or warmth of any joints at this time. Denies any neck pain or LBP at this time. Denies any fever/chills, reports mild psoriasis in back of scalp today without other rashes, no ulcers, no dry eyes, no dry mouth, no raynaud's, no infectious diarrhea or  symptoms at this time.  Patient has Osteoporosis on Dexa 1/30/24. States she taking the Fosamax that she is tolerating well at this time. Denies any history of fractures. Denies any family history of osteoporosis. States she was a former smoker.

## 2024-08-07 NOTE — PHYSICAL EXAM
[General Appearance - Alert] : alert [General Appearance - In No Acute Distress] : in no acute distress [Sclera] : the sclera and conjunctiva were normal [Extraocular Movements] : extraocular movements were intact [Impaired Insight] : insight and judgment were intact [Mood] : the mood was normal [FreeTextEntry1] : mild psoriasis of back scalp

## 2024-08-07 NOTE — REASON FOR VISIT
[Follow-Up: _____] : a [unfilled] follow-up visit [FreeTextEntry1] : Osteoporosis; Psoriasis; OA; labs/meds.

## 2024-08-07 NOTE — ASSESSMENT
[FreeTextEntry1] : 59-year-old female, here for follow up w/ hx of anxiety/depression; OA hands; reports hx of psoriasis of scalp around 2000 w/ Derm, Dr. Juwan Lewis; with follicular lymphoma diagnosed around 9/2023 on Rituxan for chemotherapy w/ her Heme/onc at this time. She has Osteoporosis. -patient reports CMP done 7/30/24 is normal with results requested from her heme/onc, Dr. Dc Rose at [facility] -reviewed labs [facility] 4/17/24 w/ BMP w/ Ca=8.6 (mildly low to monitor w/ PCP please); vit D normal; TSH normal; PTH normal; 4/9/24 w/ CBC w/ low WBC=3.0 (told to moniotr w/ her Heme/onc on Rituxan); 1/25/24 G6PD normal; 1/24/24 w/ ESR normal; CRP normal; HLA-B27 negative; RF/CCP negative; CMP ok; CBC w/ mildly low WBC=3.19 (knows to monitor w/ her Heme/onc); HIV negative  Osteoporosis: on Dexa 1/30/24 w/ lowest t score -3.0 -defers daily forteo injections for now w/ no history of fractures thus far -Discussed r/bs of refilling Fosamax 70mg PO weekly in detail to take w/ a glass of water on an empty stomach and to avoid laying down for 30mins and understands. -denies any dental issues w/ no recent or plans for any dental extraction or implants and will inform her dentist she is on fosamax -aside from being postmenopausal put in to checked for secondary causes of osteoporosis incld normal TSH, normal PTH, normal vitD on labs 4/17/24 -encouraged Ca+vitD supplementation -encouraged weight bearing exercises as tolerated.  Psoriasis: states of scalp at times -discussed r/b/s of refilling clobetasol shampoo PRN w/ pt agreeable and prescription sent as below -states was on Otezla in the past w/ her Derm but stopped as had diarrhea on it -states she was on Sotyktu w/ her Derm and stopped when diagnosed w/ lymphoma -No synovitis or effusion on exam noted today and advised to monitor. -discussed if developed psoriatic arthritis in the future will consider Cosentyx then perhaps and not needed at this time -labs as below incld ESR, CRP, serologies to be complete  OA hands: noted to have heberden nodes at DIPs with PIP hypertrophy without synovitis at this time. -voltaren gel 1% PRN   Depression/Anxiety: denies any SI or HI. -on lexapro 10mg daily -not much tender points of fibromyalgia today  -educated on symptoms to monitor for in detail and alert us if any concerns. -knows to stay up to date on health maintenance w/ PCP -f/u in 10-12 weeks w/ labs please or sooner as needed.

## 2024-09-04 ENCOUNTER — RX RENEWAL (OUTPATIENT)
Age: 59
End: 2024-09-04

## 2024-09-23 ENCOUNTER — RX RENEWAL (OUTPATIENT)
Age: 59
End: 2024-09-23

## 2024-10-30 ENCOUNTER — APPOINTMENT (OUTPATIENT)
Dept: RHEUMATOLOGY | Facility: CLINIC | Age: 59
End: 2024-10-30
Payer: COMMERCIAL

## 2024-10-30 DIAGNOSIS — M81.0 AGE-RELATED OSTEOPOROSIS W/OUT CURRENT PATHOLOGICAL FRACTURE: ICD-10-CM

## 2024-10-30 DIAGNOSIS — M19.041 PRIMARY OSTEOARTHRITIS, RIGHT HAND: ICD-10-CM

## 2024-10-30 DIAGNOSIS — F32.A ANXIETY DISORDER, UNSPECIFIED: ICD-10-CM

## 2024-10-30 DIAGNOSIS — M19.042 PRIMARY OSTEOARTHRITIS, RIGHT HAND: ICD-10-CM

## 2024-10-30 DIAGNOSIS — F41.9 ANXIETY DISORDER, UNSPECIFIED: ICD-10-CM

## 2024-10-30 DIAGNOSIS — L40.9 PSORIASIS, UNSPECIFIED: ICD-10-CM

## 2024-10-30 PROCEDURE — 99214 OFFICE O/P EST MOD 30 MIN: CPT

## 2024-10-30 PROCEDURE — G2211 COMPLEX E/M VISIT ADD ON: CPT | Mod: NC

## 2024-11-12 ENCOUNTER — APPOINTMENT (OUTPATIENT)
Dept: CARDIOLOGY | Facility: CLINIC | Age: 59
End: 2024-11-12
Payer: COMMERCIAL

## 2024-11-12 VITALS
SYSTOLIC BLOOD PRESSURE: 122 MMHG | OXYGEN SATURATION: 95 % | BODY MASS INDEX: 24.75 KG/M2 | HEIGHT: 64 IN | DIASTOLIC BLOOD PRESSURE: 80 MMHG | HEART RATE: 82 BPM | WEIGHT: 145 LBS

## 2024-11-12 DIAGNOSIS — R55 SYNCOPE AND COLLAPSE: ICD-10-CM

## 2024-11-12 DIAGNOSIS — R05.9 COUGH, UNSPECIFIED: ICD-10-CM

## 2024-11-12 PROCEDURE — 99214 OFFICE O/P EST MOD 30 MIN: CPT

## 2024-11-12 PROCEDURE — G2211 COMPLEX E/M VISIT ADD ON: CPT | Mod: NC

## 2024-11-12 PROCEDURE — 93000 ELECTROCARDIOGRAM COMPLETE: CPT

## 2024-11-12 RX ORDER — PANTOPRAZOLE 40 MG/1
40 TABLET, DELAYED RELEASE ORAL
Qty: 30 | Refills: 0 | Status: ACTIVE | COMMUNITY
Start: 2024-11-12 | End: 1900-01-01

## 2024-11-26 ENCOUNTER — APPOINTMENT (OUTPATIENT)
Dept: PULMONOLOGY | Facility: CLINIC | Age: 59
End: 2024-11-26
Payer: COMMERCIAL

## 2024-11-26 VITALS
TEMPERATURE: 97.2 F | HEIGHT: 64 IN | SYSTOLIC BLOOD PRESSURE: 136 MMHG | HEART RATE: 84 BPM | DIASTOLIC BLOOD PRESSURE: 75 MMHG | OXYGEN SATURATION: 96 % | WEIGHT: 145.6 LBS | BODY MASS INDEX: 24.86 KG/M2 | RESPIRATION RATE: 17 BRPM

## 2024-11-26 DIAGNOSIS — Z83.3 FAMILY HISTORY OF DIABETES MELLITUS: ICD-10-CM

## 2024-11-26 DIAGNOSIS — J18.9 PNEUMONIA, UNSPECIFIED ORGANISM: ICD-10-CM

## 2024-11-26 DIAGNOSIS — R05.3 CHRONIC COUGH: ICD-10-CM

## 2024-11-26 PROCEDURE — G2211 COMPLEX E/M VISIT ADD ON: CPT | Mod: NC

## 2024-11-26 PROCEDURE — 99205 OFFICE O/P NEW HI 60 MIN: CPT

## 2024-11-26 RX ORDER — AMOXICILLIN AND CLAVULANATE POTASSIUM 875; 125 MG/1; MG/1
875-125 TABLET, COATED ORAL
Qty: 14 | Refills: 0 | Status: ACTIVE | COMMUNITY
Start: 2024-11-26 | End: 1900-01-01

## 2024-11-27 PROBLEM — Z83.3 FAMILY HISTORY OF DIABETES MELLITUS: Status: ACTIVE | Noted: 2024-11-26

## 2024-11-27 PROBLEM — R05.3 CHRONIC COUGH: Status: ACTIVE | Noted: 2024-11-27

## 2024-11-27 LAB
COVID-19 NUCLEOCAPSID  GAM ANTIBODY INTERPRETATION: NEGATIVE
COVID-19 SPIKE DOMAIN ANTIBODY INTERPRETATION: POSITIVE
RAPID RVP RESULT: DETECTED
RV+EV RNA NPH QL NAA+NON-PROBE: DETECTED
SARS-COV-2 AB SERPL IA-ACNC: 26.5 U/ML
SARS-COV-2 AB SERPL QL IA: 0.26 INDEX
SARS-COV-2 RNA RESP QL NAA+PROBE: NOT DETECTED
STREPTOCOCCUS PNEUMONIAE ANTIGEN, URINE: NEGATIVE

## 2024-11-27 RX ORDER — FLUTICASONE PROPIONATE 50 UG/1
50 SPRAY, METERED NASAL TWICE DAILY
Qty: 48 | Refills: 3 | Status: ACTIVE | COMMUNITY
Start: 2024-11-27 | End: 1900-01-01

## 2024-11-30 LAB
LEGIONELLA AG UR QL: NEGATIVE
M PNEUMO IGG SER IA-ACNC: POSITIVE
M PNEUMO IGG SER QL IA: 1.38 INDEX
M PNEUMO IGM SER QL IA: 0.21 INDEX
MYCOPLASMA AG SPEC QL: NEGATIVE

## 2024-12-09 ENCOUNTER — RX RENEWAL (OUTPATIENT)
Age: 59
End: 2024-12-09

## 2025-01-02 ENCOUNTER — RX RENEWAL (OUTPATIENT)
Age: 60
End: 2025-01-02

## 2025-01-06 ENCOUNTER — APPOINTMENT (OUTPATIENT)
Dept: CT IMAGING | Facility: CLINIC | Age: 60
End: 2025-01-06
Payer: COMMERCIAL

## 2025-01-06 PROCEDURE — 71250 CT THORAX DX C-: CPT

## 2025-01-14 ENCOUNTER — APPOINTMENT (OUTPATIENT)
Dept: PULMONOLOGY | Facility: CLINIC | Age: 60
End: 2025-01-14
Payer: COMMERCIAL

## 2025-01-14 VITALS
TEMPERATURE: 97.2 F | SYSTOLIC BLOOD PRESSURE: 124 MMHG | DIASTOLIC BLOOD PRESSURE: 72 MMHG | HEIGHT: 64 IN | OXYGEN SATURATION: 98 % | BODY MASS INDEX: 24.92 KG/M2 | HEART RATE: 73 BPM | WEIGHT: 146 LBS | RESPIRATION RATE: 16 BRPM

## 2025-01-14 DIAGNOSIS — C82.03 FOLLICULAR LYMPHOMA GRADE I, INTRA-ABDOMINAL LYMPH NODES: ICD-10-CM

## 2025-01-14 DIAGNOSIS — J18.9 PNEUMONIA, UNSPECIFIED ORGANISM: ICD-10-CM

## 2025-01-14 DIAGNOSIS — Z87.898 PERSONAL HISTORY OF OTHER SPECIFIED CONDITIONS: ICD-10-CM

## 2025-01-14 PROCEDURE — 99215 OFFICE O/P EST HI 40 MIN: CPT

## 2025-01-14 PROCEDURE — G2211 COMPLEX E/M VISIT ADD ON: CPT | Mod: NC

## 2025-01-27 ENCOUNTER — RX RENEWAL (OUTPATIENT)
Age: 60
End: 2025-01-27

## 2025-01-27 ENCOUNTER — APPOINTMENT (OUTPATIENT)
Dept: RHEUMATOLOGY | Facility: CLINIC | Age: 60
End: 2025-01-27
Payer: COMMERCIAL

## 2025-01-27 DIAGNOSIS — L40.9 PSORIASIS, UNSPECIFIED: ICD-10-CM

## 2025-01-27 DIAGNOSIS — M19.042 PRIMARY OSTEOARTHRITIS, RIGHT HAND: ICD-10-CM

## 2025-01-27 DIAGNOSIS — M81.0 AGE-RELATED OSTEOPOROSIS W/OUT CURRENT PATHOLOGICAL FRACTURE: ICD-10-CM

## 2025-01-27 DIAGNOSIS — M19.041 PRIMARY OSTEOARTHRITIS, RIGHT HAND: ICD-10-CM

## 2025-01-27 DIAGNOSIS — F32.A ANXIETY DISORDER, UNSPECIFIED: ICD-10-CM

## 2025-01-27 DIAGNOSIS — F41.9 ANXIETY DISORDER, UNSPECIFIED: ICD-10-CM

## 2025-01-27 PROCEDURE — G2211 COMPLEX E/M VISIT ADD ON: CPT | Mod: NC,95

## 2025-01-27 PROCEDURE — 99214 OFFICE O/P EST MOD 30 MIN: CPT | Mod: 95

## 2025-04-07 ENCOUNTER — APPOINTMENT (OUTPATIENT)
Dept: RHEUMATOLOGY | Facility: CLINIC | Age: 60
End: 2025-04-07
Payer: COMMERCIAL

## 2025-04-07 DIAGNOSIS — M19.041 PRIMARY OSTEOARTHRITIS, RIGHT HAND: ICD-10-CM

## 2025-04-07 DIAGNOSIS — L40.9 PSORIASIS, UNSPECIFIED: ICD-10-CM

## 2025-04-07 DIAGNOSIS — M81.0 AGE-RELATED OSTEOPOROSIS W/OUT CURRENT PATHOLOGICAL FRACTURE: ICD-10-CM

## 2025-04-07 DIAGNOSIS — F32.A ANXIETY DISORDER, UNSPECIFIED: ICD-10-CM

## 2025-04-07 DIAGNOSIS — M19.042 PRIMARY OSTEOARTHRITIS, RIGHT HAND: ICD-10-CM

## 2025-04-07 DIAGNOSIS — F41.9 ANXIETY DISORDER, UNSPECIFIED: ICD-10-CM

## 2025-04-07 PROCEDURE — 99214 OFFICE O/P EST MOD 30 MIN: CPT | Mod: 95

## 2025-04-07 PROCEDURE — G2211 COMPLEX E/M VISIT ADD ON: CPT | Mod: NC,95

## 2025-05-27 ENCOUNTER — APPOINTMENT (OUTPATIENT)
Dept: PULMONOLOGY | Facility: CLINIC | Age: 60
End: 2025-05-27
Payer: COMMERCIAL

## 2025-05-27 VITALS
DIASTOLIC BLOOD PRESSURE: 75 MMHG | HEART RATE: 75 BPM | HEIGHT: 64 IN | RESPIRATION RATE: 16 BRPM | OXYGEN SATURATION: 95 % | BODY MASS INDEX: 25.27 KG/M2 | SYSTOLIC BLOOD PRESSURE: 125 MMHG | WEIGHT: 148 LBS | TEMPERATURE: 98.1 F

## 2025-05-27 DIAGNOSIS — C82.03 FOLLICULAR LYMPHOMA GRADE I, INTRA-ABDOMINAL LYMPH NODES: ICD-10-CM

## 2025-05-27 DIAGNOSIS — J18.9 PNEUMONIA, UNSPECIFIED ORGANISM: ICD-10-CM

## 2025-05-27 PROCEDURE — G2211 COMPLEX E/M VISIT ADD ON: CPT | Mod: NC

## 2025-05-27 PROCEDURE — 99214 OFFICE O/P EST MOD 30 MIN: CPT

## 2025-05-29 ENCOUNTER — RX RENEWAL (OUTPATIENT)
Age: 60
End: 2025-05-29

## 2025-07-07 ENCOUNTER — APPOINTMENT (OUTPATIENT)
Dept: RHEUMATOLOGY | Facility: CLINIC | Age: 60
End: 2025-07-07
Payer: COMMERCIAL

## 2025-07-07 PROCEDURE — 99214 OFFICE O/P EST MOD 30 MIN: CPT | Mod: 95

## 2025-07-07 PROCEDURE — G2211 COMPLEX E/M VISIT ADD ON: CPT | Mod: NC,95

## 2025-09-15 ENCOUNTER — APPOINTMENT (OUTPATIENT)
Dept: RHEUMATOLOGY | Facility: CLINIC | Age: 60
End: 2025-09-15
Payer: COMMERCIAL

## 2025-09-15 DIAGNOSIS — F41.9 ANXIETY DISORDER, UNSPECIFIED: ICD-10-CM

## 2025-09-15 DIAGNOSIS — F32.A ANXIETY DISORDER, UNSPECIFIED: ICD-10-CM

## 2025-09-15 DIAGNOSIS — M19.041 PRIMARY OSTEOARTHRITIS, RIGHT HAND: ICD-10-CM

## 2025-09-15 DIAGNOSIS — M81.0 AGE-RELATED OSTEOPOROSIS W/OUT CURRENT PATHOLOGICAL FRACTURE: ICD-10-CM

## 2025-09-15 DIAGNOSIS — M19.042 PRIMARY OSTEOARTHRITIS, RIGHT HAND: ICD-10-CM

## 2025-09-15 DIAGNOSIS — L40.9 PSORIASIS, UNSPECIFIED: ICD-10-CM

## 2025-09-15 PROCEDURE — 99214 OFFICE O/P EST MOD 30 MIN: CPT | Mod: 95

## 2025-09-15 PROCEDURE — G2211 COMPLEX E/M VISIT ADD ON: CPT | Mod: NC,95
